# Patient Record
Sex: FEMALE | Race: WHITE | NOT HISPANIC OR LATINO
[De-identification: names, ages, dates, MRNs, and addresses within clinical notes are randomized per-mention and may not be internally consistent; named-entity substitution may affect disease eponyms.]

---

## 2021-11-17 ENCOUNTER — APPOINTMENT (OUTPATIENT)
Dept: DERMATOLOGY | Facility: CLINIC | Age: 81
End: 2021-11-17
Payer: MEDICARE

## 2021-11-17 DIAGNOSIS — L82.0 INFLAMED SEBORRHEIC KERATOSIS: ICD-10-CM

## 2021-11-17 PROBLEM — Z00.00 ENCOUNTER FOR PREVENTIVE HEALTH EXAMINATION: Status: ACTIVE | Noted: 2021-11-17

## 2021-11-17 PROCEDURE — 17110 DESTRUCTION B9 LES UP TO 14: CPT

## 2021-11-17 PROCEDURE — 99202 OFFICE O/P NEW SF 15 MIN: CPT | Mod: 25

## 2021-11-17 RX ORDER — AMLODIPINE AND VALSARTAN 10; 160 MG/1; MG/1
10-160 TABLET, FILM COATED ORAL
Refills: 0 | Status: ACTIVE | COMMUNITY

## 2021-11-17 RX ORDER — ATORVASTATIN CALCIUM 80 MG/1
TABLET, FILM COATED ORAL
Refills: 0 | Status: ACTIVE | COMMUNITY

## 2021-11-17 NOTE — ASSESSMENT
[FreeTextEntry1] : Multiple seborrheic keratoses present diffusely\par Seborrheic keratosis, inflamed on right preauricular area

## 2021-11-17 NOTE — HISTORY OF PRESENT ILLNESS
[FreeTextEntry1] : Evaluation of growths [de-identified] : First visit for 81-year-old white female presenting for evaluation of growths.  Particularly concerned about a tender lesion on the right cheek. No history of skin cancer.

## 2021-11-30 ENCOUNTER — NON-APPOINTMENT (OUTPATIENT)
Age: 81
End: 2021-11-30

## 2022-05-31 ENCOUNTER — APPOINTMENT (OUTPATIENT)
Dept: DERMATOLOGY | Facility: CLINIC | Age: 82
End: 2022-05-31
Payer: MEDICARE

## 2022-05-31 DIAGNOSIS — D48.5 NEOPLASM OF UNCERTAIN BEHAVIOR OF SKIN: ICD-10-CM

## 2022-05-31 PROCEDURE — 11102 TANGNTL BX SKIN SINGLE LES: CPT

## 2022-05-31 RX ORDER — AMLODIPINE BESYLATE 10 MG/1
10 TABLET ORAL
Qty: 90 | Refills: 0 | Status: ACTIVE | COMMUNITY
Start: 2022-01-26

## 2022-05-31 RX ORDER — OLMESARTAN MEDOXOMIL 40 MG/1
40 TABLET, FILM COATED ORAL
Qty: 90 | Refills: 0 | Status: ACTIVE | COMMUNITY
Start: 2022-01-26

## 2022-05-31 NOTE — PHYSICAL EXAM
[Alert] : alert [Oriented x 3] : ~L oriented x 3 [Well Nourished] : well nourished [FreeTextEntry3] : Patient wearing a facemask\par \par Right mid shin:  25 x 16 mm pink scaly plaque

## 2022-05-31 NOTE — HISTORY OF PRESENT ILLNESS
[FreeTextEntry1] : Growth on right mid shin [de-identified] : Followup visit for 81-year-old white female last seen by me in November 17, 2021, (at which time she had a full skin exam) presenting with a six-month history of a lesion on the right mid shin.\par No history of skin cancer.

## 2022-06-08 ENCOUNTER — NON-APPOINTMENT (OUTPATIENT)
Age: 82
End: 2022-06-08

## 2022-06-15 ENCOUNTER — NON-APPOINTMENT (OUTPATIENT)
Age: 82
End: 2022-06-15

## 2022-06-15 LAB — CORE LAB BIOPSY: NORMAL

## 2022-08-10 ENCOUNTER — NON-APPOINTMENT (OUTPATIENT)
Age: 82
End: 2022-08-10

## 2022-08-11 ENCOUNTER — APPOINTMENT (OUTPATIENT)
Dept: DERMATOLOGY | Facility: CLINIC | Age: 82
End: 2022-08-11

## 2022-08-11 PROCEDURE — 99213 OFFICE O/P EST LOW 20 MIN: CPT

## 2022-08-11 RX ORDER — BETAMETHASONE DIPROPIONATE 0.5 MG/G
0.05 CREAM, AUGMENTED TOPICAL
Qty: 1 | Refills: 2 | Status: ACTIVE | COMMUNITY
Start: 2022-08-11 | End: 1900-01-01

## 2022-08-11 NOTE — HISTORY OF PRESENT ILLNESS
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

## 2022-08-11 NOTE — PHYSICAL EXAM
[Alert] : alert [Oriented x 3] : ~L oriented x 3 [Well Nourished] : well nourished [FreeTextEntry3] : Patient wearing a face mask\par \par Right upper lateral leg: Mild to moderate pink scaly plaque with a few small pink slightly scaly plaques present around it\par Left upper shin: Single small bright erythematous plaque

## 2022-11-14 ENCOUNTER — APPOINTMENT (OUTPATIENT)
Dept: DERMATOLOGY | Facility: CLINIC | Age: 82
End: 2022-11-14

## 2022-11-14 DIAGNOSIS — L57.0 ACTINIC KERATOSIS: ICD-10-CM

## 2022-11-14 DIAGNOSIS — L82.1 OTHER SEBORRHEIC KERATOSIS: ICD-10-CM

## 2022-11-14 DIAGNOSIS — L40.0 PSORIASIS VULGARIS: ICD-10-CM

## 2022-11-14 PROCEDURE — 99213 OFFICE O/P EST LOW 20 MIN: CPT

## 2022-11-14 NOTE — HISTORY OF PRESENT ILLNESS
[FreeTextEntry1] : Evaluation of growths and psoriasis [de-identified] : Follow-up visit for 82-year-old white female last seen by me on August 11, 2022, presenting for evaluation of growths.\par No history of skin cancer\par \par Patient has history of a biopsy-proven psoriatic plaque on the right mid shin and a smaller focus on the left upper shin.  Treated with:\par Start betamethasone dipropionate augmented cream 0.05% to affected areas once or twice a day -uses this sporadically\par \par

## 2022-11-14 NOTE — ASSESSMENT
[FreeTextEntry1] : The following areas were examined and no significant abnormalities were seen except as noted below:\par \par Type II skin\par \par scalp, face, eyelids, nose, lips, ears, neck, chest, abdomen, back, buttocks, right arm, left arm, right hand, left hand,\par right  leg, left leg, right foot, left foot\par Breast and groin exams offered and declined by patient.\par \par Face, supraclavicular area, and back: Multiple multiple black verrucous papules and plaques\par Left arm: Single small pink scaly macule\par Right shin: Mild to moderate pink scaly patches\par Left leg: Clear\par \par No suspicious lesions seen

## 2022-11-14 NOTE — PHYSICAL EXAM
[Alert] : alert [Oriented x 3] : ~L oriented x 3 [Well Nourished] : well nourished [FreeTextEntry3] : The following areas were examined and no significant abnormalities were seen except as noted below:\par \par Type II skin\par \par scalp, face, eyelids, nose, lips, ears, neck, chest, abdomen, back, buttocks, right arm, left arm, right hand, left hand,\par right  leg, left leg, right foot, left foot\par Breast and groin exams offered and declined by patient.\par \par Face, supraclavicular area, and back: Multiple multiple black verrucous papules and plaques\par Left arm: Single small pink scaly macule\par Right shin: Mild to moderate pink scaly patches\par Left leg: Clear\par \par No other suspicious lesions seen

## 2023-06-12 ENCOUNTER — APPOINTMENT (OUTPATIENT)
Dept: DERMATOLOGY | Facility: CLINIC | Age: 83
End: 2023-06-12
Payer: MEDICARE

## 2023-06-12 DIAGNOSIS — L23.7 ALLERGIC CONTACT DERMATITIS DUE TO PLANTS, EXCEPT FOOD: ICD-10-CM

## 2023-06-12 PROCEDURE — 99214 OFFICE O/P EST MOD 30 MIN: CPT

## 2023-06-12 RX ORDER — TRIAMCINOLONE ACETONIDE 1 MG/G
0.1 CREAM TOPICAL
Qty: 1 | Refills: 2 | Status: ACTIVE | COMMUNITY
Start: 2023-06-12 | End: 1900-01-01

## 2023-06-12 RX ORDER — PREDNISONE 20 MG/1
20 TABLET ORAL
Qty: 35 | Refills: 0 | Status: ACTIVE | COMMUNITY
Start: 2023-06-12 | End: 1900-01-01

## 2023-06-12 NOTE — HISTORY OF PRESENT ILLNESS
[FreeTextEntry1] : Rash [de-identified] : Follow-up visit for 83-year-old white female last seen by me on November 14, 2022, (at which time she had a full skin exam) with a history of biopsy-proven psoriasis.  Last treated with:\par Continue betamethasone dipropionate augmented cream 0.05% to affected areas once or twice a day as needed.\par \par Patient presents today with a 1 week history of a progressive mildly pruritic rash starting on the right wrist and spreading to abdomen, groin, buttocks, scalp, face.\par Saw primary care physician.  Treated with mupirocin ointment 2% and later with fluocinonide cream 0.05%.  Patient also treated with a Medrol Dosepak.\par \par Rash continues to spread.  No previous episodes.\par Note: Patient recently treated with p.o. cephalexin for urinary tract infection.

## 2023-06-12 NOTE — PHYSICAL EXAM
[Alert] : alert [Oriented x 3] : ~L oriented x 3 [Well Nourished] : well nourished [FreeTextEntry3] : Blood pressure: 146/76\par \par Face: Mild small erythematous plaques on the forehead, upper eyelids, with a larger plaque on the right angle of the jaw\par  large erythematous plaques present on the left hip area with moderate plaques present on the right flank, right buttock, and right upper thigh\par \par Right wrist: Mild to moderate dull erythematous bullous plaque\par Left wrist: Few small erythematous confluent papules

## 2023-06-12 NOTE — ASSESSMENT
[FreeTextEntry1] : Rash is suggestive of severe poison ivy\par Drug eruption is less likely due to asymmetry of the rash

## 2023-06-15 ENCOUNTER — NON-APPOINTMENT (OUTPATIENT)
Age: 83
End: 2023-06-15

## 2024-08-22 ENCOUNTER — APPOINTMENT (OUTPATIENT)
Dept: DERMATOLOGY | Facility: CLINIC | Age: 84
End: 2024-08-22
Payer: MEDICARE

## 2024-08-22 DIAGNOSIS — L82.1 OTHER SEBORRHEIC KERATOSIS: ICD-10-CM

## 2024-08-22 DIAGNOSIS — D48.5 NEOPLASM OF UNCERTAIN BEHAVIOR OF SKIN: ICD-10-CM

## 2024-08-22 DIAGNOSIS — R20.8 OTHER DISTURBANCES OF SKIN SENSATION: ICD-10-CM

## 2024-08-22 DIAGNOSIS — B07.9 VIRAL WART, UNSPECIFIED: ICD-10-CM

## 2024-08-22 PROCEDURE — 17110 DESTRUCTION B9 LES UP TO 14: CPT

## 2024-08-22 PROCEDURE — 99213 OFFICE O/P EST LOW 20 MIN: CPT | Mod: 25

## 2024-08-22 NOTE — PHYSICAL EXAM
[Alert] : alert [Oriented x 3] : ~L oriented x 3 [Well Nourished] : well nourished [FreeTextEntry3] : The following areas were examined and no significant abnormalities were seen except as noted below:  Type II skin  scalp, face, eyelids, nose, lips, ears, neck, chest, abdomen, back, buttocks, right arm, left arm, right hand, left hand, right  leg, left leg, right foot, left foot Breast and groin exams offered and declined by patient.  Face: Moderate multiple small brown verrucous papules 1 on the left mid cheek is more pink and verrucous Left upper inner breast: 6 x 4 mm pink verrucous plaque Chest and back: Multiple multiple small and large brown verrucous plaques Right mid shin: 8 x 8 mm pink slightly elevated smooth plaque with hint of hyperpigmented border-not clearly suspicious on dermoscopy  No other suspicious lesions seen

## 2024-08-22 NOTE — HISTORY OF PRESENT ILLNESS
[FreeTextEntry1] : Evaluation of growths [de-identified] : Follow-up visit for 84-year-old white female last seen by me on June 12, 2023, for evaluation of growths. Particularly concerned about lesions on the face.  No history of skin cancer.  Patient has history of biopsy-proven psoriasis.  Last treated with: Continue betamethasone dipropionate augmented cream 0.05% once or twice a day as needed

## 2024-08-22 NOTE — ASSESSMENT
[FreeTextEntry1] : ?  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

## 2024-08-22 NOTE — PLAN
[TextEntry] : Will observe the lesion on the right mid shin-photo taken Eradicate warts  1% lidocaine with epinephrine Light electrofulguration (6) and curettage-lesions on left and right cheek Scissor orf wart on left upper medial breast Curette off 1 larger lesion on the right upper back Aluminum chloride Vaseline  Return as needed-for elective removal of seborrheic keratoses  Elizabeth, medical assistant, served as chaperone and was present for the entire skin exam.

## 2024-10-31 ENCOUNTER — APPOINTMENT (OUTPATIENT)
Dept: DERMATOLOGY | Facility: CLINIC | Age: 84
End: 2024-10-31

## 2025-05-20 ENCOUNTER — APPOINTMENT (OUTPATIENT)
Dept: DERMATOLOGY | Facility: CLINIC | Age: 85
End: 2025-05-20
Payer: MEDICARE

## 2025-05-20 DIAGNOSIS — L82.1 OTHER SEBORRHEIC KERATOSIS: ICD-10-CM

## 2025-05-20 DIAGNOSIS — D48.5 NEOPLASM OF UNCERTAIN BEHAVIOR OF SKIN: ICD-10-CM

## 2025-05-20 PROCEDURE — 99212 OFFICE O/P EST SF 10 MIN: CPT | Mod: 25

## 2025-05-20 PROCEDURE — 11102 TANGNTL BX SKIN SINGLE LES: CPT

## 2025-05-29 ENCOUNTER — NON-APPOINTMENT (OUTPATIENT)
Age: 85
End: 2025-05-29